# Patient Record
Sex: MALE | Race: ASIAN | Employment: STUDENT | URBAN - METROPOLITAN AREA
[De-identification: names, ages, dates, MRNs, and addresses within clinical notes are randomized per-mention and may not be internally consistent; named-entity substitution may affect disease eponyms.]

---

## 2019-02-19 ENCOUNTER — HOSPITAL ENCOUNTER (EMERGENCY)
Age: 34
Discharge: HOME | End: 2019-02-19
Attending: EMERGENCY MEDICINE
Payer: SELF-PAY

## 2019-02-19 ENCOUNTER — APPOINTMENT (OUTPATIENT)
Dept: CT IMAGING | Age: 34
End: 2019-02-19
Attending: EMERGENCY MEDICINE
Payer: SELF-PAY

## 2019-02-19 ENCOUNTER — APPOINTMENT (OUTPATIENT)
Dept: ULTRASOUND IMAGING | Age: 34
End: 2019-02-19
Attending: EMERGENCY MEDICINE
Payer: SELF-PAY

## 2019-02-19 VITALS
HEIGHT: 67 IN | WEIGHT: 130 LBS | BODY MASS INDEX: 20.4 KG/M2 | HEART RATE: 62 BPM | TEMPERATURE: 97.8 F | DIASTOLIC BLOOD PRESSURE: 93 MMHG | SYSTOLIC BLOOD PRESSURE: 127 MMHG | OXYGEN SATURATION: 100 % | RESPIRATION RATE: 29 BRPM

## 2019-02-19 DIAGNOSIS — N21.1 URETHRAL STONE: Primary | ICD-10-CM

## 2019-02-19 LAB
ALBUMIN SERPL-MCNC: 4 G/DL (ref 3.4–5)
ALBUMIN/GLOB SERPL: 1.3 {RATIO} (ref 0.8–1.7)
ALP SERPL-CCNC: 69 U/L (ref 45–117)
ALT SERPL-CCNC: 27 U/L (ref 16–61)
ANION GAP SERPL CALC-SCNC: 11 MMOL/L (ref 3–18)
APPEARANCE UR: CLEAR
AST SERPL-CCNC: 15 U/L (ref 15–37)
BACTERIA URNS QL MICRO: ABNORMAL /HPF
BASOPHILS # BLD: 0 K/UL (ref 0–0.1)
BASOPHILS NFR BLD: 0 % (ref 0–2)
BILIRUB SERPL-MCNC: 0.5 MG/DL (ref 0.2–1)
BILIRUB UR QL: NEGATIVE
BUN SERPL-MCNC: 13 MG/DL (ref 7–18)
BUN/CREAT SERPL: 14 (ref 12–20)
CALCIUM SERPL-MCNC: 9 MG/DL (ref 8.5–10.1)
CHLORIDE SERPL-SCNC: 103 MMOL/L (ref 100–108)
CO2 SERPL-SCNC: 23 MMOL/L (ref 21–32)
COLOR UR: YELLOW
CREAT SERPL-MCNC: 0.91 MG/DL (ref 0.6–1.3)
DIFFERENTIAL METHOD BLD: ABNORMAL
EOSINOPHIL # BLD: 0.1 K/UL (ref 0–0.4)
EOSINOPHIL NFR BLD: 1 % (ref 0–5)
EPITH CASTS URNS QL MICRO: ABNORMAL /LPF (ref 0–5)
ERYTHROCYTE [DISTWIDTH] IN BLOOD BY AUTOMATED COUNT: 13.6 % (ref 11.6–14.5)
GLOBULIN SER CALC-MCNC: 3.2 G/DL (ref 2–4)
GLUCOSE SERPL-MCNC: 138 MG/DL (ref 74–99)
GLUCOSE UR STRIP.AUTO-MCNC: NEGATIVE MG/DL
HCT VFR BLD AUTO: 38.6 % (ref 36–48)
HGB BLD-MCNC: 12.5 G/DL (ref 13–16)
HGB UR QL STRIP: ABNORMAL
KETONES UR QL STRIP.AUTO: NEGATIVE MG/DL
LEUKOCYTE ESTERASE UR QL STRIP.AUTO: NEGATIVE
LIPASE SERPL-CCNC: 150 U/L (ref 73–393)
LYMPHOCYTES # BLD: 3.7 K/UL (ref 0.9–3.6)
LYMPHOCYTES NFR BLD: 37 % (ref 21–52)
MAGNESIUM SERPL-MCNC: 1.9 MG/DL (ref 1.6–2.6)
MCH RBC QN AUTO: 26.4 PG (ref 24–34)
MCHC RBC AUTO-ENTMCNC: 32.4 G/DL (ref 31–37)
MCV RBC AUTO: 81.4 FL (ref 74–97)
MONOCYTES # BLD: 0.5 K/UL (ref 0.05–1.2)
MONOCYTES NFR BLD: 5 % (ref 3–10)
NEUTS SEG # BLD: 5.6 K/UL (ref 1.8–8)
NEUTS SEG NFR BLD: 57 % (ref 40–73)
NITRITE UR QL STRIP.AUTO: NEGATIVE
PH UR STRIP: 6 [PH] (ref 5–8)
PLATELET # BLD AUTO: 228 K/UL (ref 135–420)
PMV BLD AUTO: 10.4 FL (ref 9.2–11.8)
POTASSIUM SERPL-SCNC: 3.6 MMOL/L (ref 3.5–5.5)
PROT SERPL-MCNC: 7.2 G/DL (ref 6.4–8.2)
PROT UR STRIP-MCNC: NEGATIVE MG/DL
RBC # BLD AUTO: 4.74 M/UL (ref 4.7–5.5)
RBC #/AREA URNS HPF: ABNORMAL /HPF (ref 0–5)
SODIUM SERPL-SCNC: 137 MMOL/L (ref 136–145)
SP GR UR REFRACTOMETRY: 1.02 (ref 1–1.03)
UROBILINOGEN UR QL STRIP.AUTO: 0.2 EU/DL (ref 0.2–1)
WBC # BLD AUTO: 9.9 K/UL (ref 4.6–13.2)
WBC URNS QL MICRO: ABNORMAL /HPF (ref 0–5)

## 2019-02-19 PROCEDURE — 99285 EMERGENCY DEPT VISIT HI MDM: CPT

## 2019-02-19 PROCEDURE — 74011250637 HC RX REV CODE- 250/637: Performed by: EMERGENCY MEDICINE

## 2019-02-19 PROCEDURE — 83735 ASSAY OF MAGNESIUM: CPT

## 2019-02-19 PROCEDURE — 96361 HYDRATE IV INFUSION ADD-ON: CPT

## 2019-02-19 PROCEDURE — 76705 ECHO EXAM OF ABDOMEN: CPT

## 2019-02-19 PROCEDURE — 74177 CT ABD & PELVIS W/CONTRAST: CPT

## 2019-02-19 PROCEDURE — 96374 THER/PROPH/DIAG INJ IV PUSH: CPT

## 2019-02-19 PROCEDURE — 74011250636 HC RX REV CODE- 250/636: Performed by: EMERGENCY MEDICINE

## 2019-02-19 PROCEDURE — 81001 URINALYSIS AUTO W/SCOPE: CPT

## 2019-02-19 PROCEDURE — 80053 COMPREHEN METABOLIC PANEL: CPT

## 2019-02-19 PROCEDURE — 74011636320 HC RX REV CODE- 636/320: Performed by: EMERGENCY MEDICINE

## 2019-02-19 PROCEDURE — 96375 TX/PRO/DX INJ NEW DRUG ADDON: CPT

## 2019-02-19 PROCEDURE — 85025 COMPLETE CBC W/AUTO DIFF WBC: CPT

## 2019-02-19 PROCEDURE — 83690 ASSAY OF LIPASE: CPT

## 2019-02-19 PROCEDURE — 51798 US URINE CAPACITY MEASURE: CPT

## 2019-02-19 RX ORDER — LIDOCAINE 4 G/100G
PATCH TOPICAL
Qty: 1 PACKAGE | Refills: 0 | Status: SHIPPED | OUTPATIENT
Start: 2019-02-19 | End: 2019-02-22

## 2019-02-19 RX ORDER — ACETAMINOPHEN 325 MG/1
650 TABLET ORAL
Qty: 20 TAB | Refills: 0 | Status: SHIPPED | OUTPATIENT
Start: 2019-02-19 | End: 2019-02-22

## 2019-02-19 RX ORDER — IBUPROFEN 600 MG/1
600 TABLET ORAL
Qty: 20 TAB | Refills: 0 | Status: SHIPPED | OUTPATIENT
Start: 2019-02-19 | End: 2019-02-22

## 2019-02-19 RX ORDER — KETOROLAC TROMETHAMINE 15 MG/ML
15 INJECTION, SOLUTION INTRAMUSCULAR; INTRAVENOUS
Status: COMPLETED | OUTPATIENT
Start: 2019-02-19 | End: 2019-02-19

## 2019-02-19 RX ORDER — METOCLOPRAMIDE HYDROCHLORIDE 5 MG/ML
5 INJECTION INTRAMUSCULAR; INTRAVENOUS
Status: COMPLETED | OUTPATIENT
Start: 2019-02-19 | End: 2019-02-19

## 2019-02-19 RX ORDER — ACETAMINOPHEN 325 MG/1
650 TABLET ORAL ONCE
Status: COMPLETED | OUTPATIENT
Start: 2019-02-19 | End: 2019-02-19

## 2019-02-19 RX ORDER — TAMSULOSIN HYDROCHLORIDE 0.4 MG/1
0.4 CAPSULE ORAL DAILY
Qty: 15 CAP | Refills: 0 | Status: SHIPPED | OUTPATIENT
Start: 2019-02-19 | End: 2019-03-06

## 2019-02-19 RX ORDER — ONDANSETRON 4 MG/1
4 TABLET, ORALLY DISINTEGRATING ORAL
Qty: 12 TAB | Refills: 0 | Status: SHIPPED | OUTPATIENT
Start: 2019-02-19

## 2019-02-19 RX ORDER — LIDOCAINE HYDROCHLORIDE 10 MG/ML
30 INJECTION, SOLUTION EPIDURAL; INFILTRATION; INTRACAUDAL; PERINEURAL
Status: COMPLETED | OUTPATIENT
Start: 2019-02-19 | End: 2019-02-19

## 2019-02-19 RX ORDER — LIDOCAINE HYDROCHLORIDE 10 MG/ML
30 INJECTION, SOLUTION EPIDURAL; INFILTRATION; INTRACAUDAL; PERINEURAL
Status: DISCONTINUED | OUTPATIENT
Start: 2019-02-19 | End: 2019-02-19 | Stop reason: CLARIF

## 2019-02-19 RX ADMIN — METOCLOPRAMIDE 5 MG: 5 INJECTION, SOLUTION INTRAMUSCULAR; INTRAVENOUS at 10:54

## 2019-02-19 RX ADMIN — IOPAMIDOL 100 ML: 612 INJECTION, SOLUTION INTRAVENOUS at 11:14

## 2019-02-19 RX ADMIN — SODIUM CHLORIDE 1000 ML: 9 INJECTION, SOLUTION INTRAVENOUS at 10:03

## 2019-02-19 RX ADMIN — ACETAMINOPHEN 650 MG: 325 TABLET ORAL at 12:42

## 2019-02-19 RX ADMIN — LIDOCAINE HYDROCHLORIDE 3 ML: 10 INJECTION, SOLUTION EPIDURAL; INFILTRATION; INTRACAUDAL; PERINEURAL at 10:17

## 2019-02-19 RX ADMIN — SODIUM CHLORIDE 1000 ML: 900 INJECTION, SOLUTION INTRAVENOUS at 10:30

## 2019-02-19 RX ADMIN — KETOROLAC TROMETHAMINE 15 MG: 15 INJECTION, SOLUTION INTRAMUSCULAR; INTRAVENOUS at 10:26

## 2019-02-19 NOTE — ED TRIAGE NOTES
Patient arrived via EMS from [facility] c/o RLQ pain 10/10. Patient appears diaphoretic and in mild distress. VSS and pt has a hx of appendectomy previously. No other abdominal surgeries noted.

## 2019-02-19 NOTE — ED NOTES
Patient returned from CT at this time via stretcher with EDT. Patient is calm and cooperative, Bonny Isdiro, and unable to provide a urine specimen. Patient advised to alert the nurse if he is able to urinate. Pending U/A and CT read. Signed By: Zach Giron February 19, 2019

## 2019-02-19 NOTE — DISCHARGE INSTRUCTIONS
Kidney Stone: Care Instructions  Your Care Instructions    Kidney stones are formed when salts, minerals, and other substances normally found in the urine clump together. They can be as small as grains of sand or, rarely, as large as golf balls. While the stone is traveling through the ureter, which is the tube that carries urine from the kidney to the bladder, you will probably feel pain. The pain may be mild or very severe. You may also have some blood in your urine. As soon as the stone reaches the bladder, any intense pain should go away. If a stone is too large to pass on its own, you may need a medical procedure to help you pass the stone. The doctor has checked you carefully, but problems can develop later. If you notice any problems or new symptoms, get medical treatment right away. Follow-up care is a key part of your treatment and safety. Be sure to make and go to all appointments, and call your doctor if you are having problems. It's also a good idea to know your test results and keep a list of the medicines you take. How can you care for yourself at home? · Drink plenty of fluids, enough so that your urine is light yellow or clear like water. If you have kidney, heart, or liver disease and have to limit fluids, talk with your doctor before you increase the amount of fluids you drink. · Take pain medicines exactly as directed. Call your doctor if you think you are having a problem with your medicine. ? If the doctor gave you a prescription medicine for pain, take it as prescribed. ? If you are not taking a prescription pain medicine, ask your doctor if you can take an over-the-counter medicine. Read and follow all instructions on the label. · Your doctor may ask you to strain your urine so that you can collect your kidney stone when it passes. You can use a kitchen strainer or a tea strainer to catch the stone. Store it in a plastic bag until you see your doctor again.   Preventing future kidney stones  Some changes in your diet may help prevent kidney stones. Depending on the cause of your stones, your doctor may recommend that you:  · Drink plenty of fluids, enough so that your urine is light yellow or clear like water. If you have kidney, heart, or liver disease and have to limit fluids, talk with your doctor before you increase the amount of fluids you drink. · Limit coffee, tea, and alcohol. Also avoid grapefruit juice. · Do not take more than the recommended daily dose of vitamins C and D.  · Avoid antacids such as Gaviscon, Maalox, Mylanta, or Tums. · Limit the amount of salt (sodium) in your diet. · Eat a balanced diet that is not too high in protein. · Limit foods that are high in a substance called oxalate, which can cause kidney stones. These foods include dark green vegetables, rhubarb, chocolate, wheat bran, nuts, cranberries, and beans. When should you call for help? Call your doctor now or seek immediate medical care if:    · You cannot keep down fluids.     · Your pain gets worse.     · You have a fever or chills.     · You have new or worse pain in your back just below your rib cage (the flank area).     · You have new or more blood in your urine.    Watch closely for changes in your health, and be sure to contact your doctor if:    · You do not get better as expected. Where can you learn more? Go to http://slim-michael.info/. Enter E129 in the search box to learn more about \"Kidney Stone: Care Instructions. \"  Current as of: March 14, 2018  Content Version: 11.9  © 2895-3468 [employer]. Care instructions adapted under license by [employer] (which disclaims liability or warranty for this information). If you have questions about a medical condition or this instruction, always ask your healthcare professional. [address] any warranty or liability for your use of this information.

## 2019-02-19 NOTE — ED PROVIDER NOTES
EMERGENCY DEPARTMENT HISTORY AND PHYSICAL EXAM 
 
Date: 2/19/2019 Patient Name: Ramez Crouch History of Presenting Illness Chief Complaint Patient presents with  Abdominal Pain History Provided By: Patient Chief Complaint: RLQ abdominal pain Duration: 1 Days Timing:  Worsening Location: RLQ abdomen Severity: Severe Modifying Factors: 75 mcg of Fentanyl, and 4 mg of Zofran without relief Associated Symptoms: denies any other associated signs or symptoms Additional History (Context):  
9:52 AM 
Ramez Crouch is a 29 y.o. male with PSHx of appendectomy and no other PMHx who presents to the emergency department via EMS for severe, non-radiating RLQ abdominal pain starting yesterday. Reports the pain was tolerable yesterday, but became excruciating and constant this morning. The patient went to [facility], who called EMS. EMS has administered 75 mcg of Fentanyl, and 4 mg of Zofran without relief, and started the patient on fluids. Denies fever, chest pain, vomiting, diarrhea, hematuria, and any other symptoms or complaints. PCP: None Past History Past Medical History: 
History reviewed. No pertinent past medical history. Past Surgical History: 
Past Surgical History:  
Procedure Laterality Date  HX APPENDECTOMY Family History: 
History reviewed. No pertinent family history. Social History: 
Social History Tobacco Use  Smoking status: Never Smoker  Smokeless tobacco: Never Used Substance Use Topics  Alcohol use: Yes  Drug use: No  
 
 
Allergies: 
No Known Allergies Review of Systems Review of Systems Constitutional: Negative for fever. Cardiovascular: Negative for chest pain. Gastrointestinal: Positive for abdominal pain (RLQ). Negative for diarrhea and vomiting. Genitourinary: Negative for hematuria. All other systems reviewed and are negative. Physical Exam  
 
Vitals: 02/19/19 0955 02/19/19 0957 02/19/19 1000 02/19/19 1017 BP: (!) 134/97 (!) 134/97 128/73 (!) 127/93 Pulse: (!) 59 (!) 54 (!) 57 62 Resp:  17 26 29 Temp:  97.8 °F (36.6 °C) SpO2: 99% 100% 100% 100% Weight:  59 kg (130 lb) Height:  5' 7\" (1.702 m) Physical Exam  
Nursing note and vitals reviewed. Constitutional: Pale appearing, young  male, groaning, moaning, anxious, and appears uncomfortable Head: Normocephalic, Atraumatic Eyes: Pupils are equal, round, and reactive to light, EOMI, no scleral icterus, no conjunctival pallor ENT: dry mucous membranes, hearing intact Neck: Supple, non-tender Cardiovascular: Regular rate and rhythm, no murmurs, rubs, or gallops Chest: Normal work of breathing and chest excursion bilaterally Lungs: Clear to ausculation bilaterally, no wheezes, no rhonchi Abdomen: Tenderness to the right upper and lower quadrants. : No testicular swelling, no pain or tenderness Back: No evidence of trauma or deformity Extremities: No evidence of trauma or deformity, no LE edema Skin: Warm and dry, pale appearing, normal cap refill Neuro: Alert and appropriate, CN intact, normal speech, moving all 4 extremities freely and symmetrically Psychiatric: Cooperative, appropriate mood Diagnostic Study Results Labs - Recent Results (from the past 12 hour(s)) CBC WITH AUTOMATED DIFF Collection Time: 02/19/19  9:59 AM  
Result Value Ref Range WBC 9.9 4.6 - 13.2 K/uL  
 RBC 4.74 4.70 - 5.50 M/uL  
 HGB 12.5 (L) 13.0 - 16.0 g/dL HCT 38.6 36.0 - 48.0 % MCV 81.4 74.0 - 97.0 FL  
 MCH 26.4 24.0 - 34.0 PG  
 MCHC 32.4 31.0 - 37.0 g/dL  
 RDW 13.6 11.6 - 14.5 % PLATELET 919 917 - 280 K/uL MPV 10.4 9.2 - 11.8 FL  
 NEUTROPHILS 57 40 - 73 % LYMPHOCYTES 37 21 - 52 % MONOCYTES 5 3 - 10 % EOSINOPHILS 1 0 - 5 % BASOPHILS 0 0 - 2 %  
 ABS. NEUTROPHILS 5.6 1.8 - 8.0 K/UL  
 ABS. LYMPHOCYTES 3.7 (H) 0.9 - 3.6 K/UL ABS. MONOCYTES 0.5 0.05 - 1.2 K/UL  
 ABS. EOSINOPHILS 0.1 0.0 - 0.4 K/UL  
 ABS. BASOPHILS 0.0 0.0 - 0.1 K/UL  
 DF AUTOMATED METABOLIC PANEL, COMPREHENSIVE Collection Time: 02/19/19  9:59 AM  
Result Value Ref Range Sodium 137 136 - 145 mmol/L Potassium 3.6 3.5 - 5.5 mmol/L Chloride 103 100 - 108 mmol/L  
 CO2 23 21 - 32 mmol/L Anion gap 11 3.0 - 18 mmol/L Glucose 138 (H) 74 - 99 mg/dL BUN 13 7.0 - 18 MG/DL Creatinine 0.91 0.6 - 1.3 MG/DL  
 BUN/Creatinine ratio 14 12 - 20 GFR est AA >60 >60 ml/min/1.73m2 GFR est non-AA >60 >60 ml/min/1.73m2 Calcium 9.0 8.5 - 10.1 MG/DL Bilirubin, total 0.5 0.2 - 1.0 MG/DL  
 ALT (SGPT) 27 16 - 61 U/L  
 AST (SGOT) 15 15 - 37 U/L Alk. phosphatase 69 45 - 117 U/L Protein, total 7.2 6.4 - 8.2 g/dL Albumin 4.0 3.4 - 5.0 g/dL Globulin 3.2 2.0 - 4.0 g/dL A-G Ratio 1.3 0.8 - 1.7 LIPASE Collection Time: 02/19/19  9:59 AM  
Result Value Ref Range Lipase 150 73 - 393 U/L MAGNESIUM Collection Time: 02/19/19  9:59 AM  
Result Value Ref Range Magnesium 1.9 1.6 - 2.6 mg/dL URINALYSIS W/ RFLX MICROSCOPIC Collection Time: 02/19/19 11:45 AM  
Result Value Ref Range Color YELLOW Appearance CLEAR Specific gravity 1.025 1.005 - 1.030    
 pH (UA) 6.0 5.0 - 8.0 Protein NEGATIVE  NEG mg/dL Glucose NEGATIVE  NEG mg/dL Ketone NEGATIVE  NEG mg/dL Bilirubin NEGATIVE  NEG Blood MODERATE (A) NEG Urobilinogen 0.2 0.2 - 1.0 EU/dL Nitrites NEGATIVE  NEG Leukocyte Esterase NEGATIVE  NEG    
URINE MICROSCOPIC ONLY Collection Time: 02/19/19 11:45 AM  
Result Value Ref Range WBC 1 to 3 0 - 5 /hpf  
 RBC 8 to 12 0 - 5 /hpf Epithelial cells 1+ 0 - 5 /lpf Bacteria FEW (A) NEG /hpf Radiologic Studies -  
CT ABD PELV W CONT Final Result IMPRESSION:  
  
1. Mild to moderate right-sided hydroureteronephrosis related to a roughly 2 mm stone present at the right-sided ureterovesicular junction. 2. Mild periportal edema and gallbladder wall thickening likely secondary to  
changes of hydration. Notably no evidence of gallbladder wall thickening noted  
on the preceding ultrasound exam obtained approximately 1 hour previously. As read by the radiologist.   
Rolfe Fleischer Final Result IMPRESSION:  
  
  
1. No evidence of gallstones or findings to suggest acute cholecystitis. 2. Moderate right-sided hydronephrosis, of unclear etiology on the provided  
imaging. Correlation for history of nephrolithiasis and hematuria may be  
helpful. As read by the radiologist.   
 
CT Results  (Last 48 hours) 02/19/19 1136  CT ABD PELV W CONT Final result Impression:  IMPRESSION:  
   
1. Mild to moderate right-sided hydroureteronephrosis related to a roughly 2 mm  
stone present at the right-sided ureterovesicular junction. 2. Mild periportal edema and gallbladder wall thickening likely secondary to  
changes of hydration. Notably no evidence of gallbladder wall thickening noted  
on the preceding ultrasound exam obtained approximately 1 hour previously. Narrative:  EXAM: CT of the abdomen and pelvis INDICATION: Right-sided abdominal pain COMPARISON: Same-day ultrasound TECHNIQUE: Axial CT imaging of the abdomen and pelvis was performed without oral  
and with intravenous contrast. Multiplanar reformats were generated. One or more dose reduction techniques were used on this CT: automated exposure  
control, adjustment of the mAs and/or kVp according to patient size, and  
iterative reconstruction techniques. The specific techniques used on this CT  
exam have been documented in the patient's electronic medical record.  Digital  
Imaging and Communications in Medicine (DICOM) format image data are available  
to nonaffiliated external healthcare facilities or entities on a secure, media  
free, reciprocally searchable basis with patient authorization for at least a  
12-month period after this study. _______________ FINDINGS:  
   
LOWER CHEST: Lung bases are clear. Normal cardiac size. No pericardial effusion. LIVER, BILIARY: Hepatic parenchymal enhancement is uniform. Mild periportal  
edema noted. Intrahepatic or extrahepatic biliary ductal dilatation. There is  
mild wall thickening noted which is in the interval from same day right upper  
quadrant ultrasound. PANCREAS: Normal.  
   
SPLEEN: Normal.  
   
ADRENALS: Normal.  
   
KIDNEYS/URETERS/BLADDER: There is asymmetric hypoenhancement of the right kidney  
noted with mild to moderate right-sided hydroureteronephrosis present. There is  
a roughly 2 mm stone present at the right ureterovesicular junction. PELVIC ORGANS: Unremarkable. VASCULATURE: Unremarkable LYMPH NODES: There are scattered subcentimeter mesenteric and retroperitoneal  
lymph nodes present without evidence of abdominal or pelvic adenopathy. GASTROINTESTINAL TRACT: Stomach, small intestine, and large intestine are normal  
in course and caliber. No bowel obstruction. No free intraperitoneal gas. Surgical clips at the cecal base are noted in keeping with prior appendectomy. BONES: No acute or aggressive osseous abnormalities identified. OTHER: Small fat-containing umbilical hernia.  
   
_______________ CXR Results  (Last 48 hours) None Medications given in the ED- Medications  
sodium chloride 0.9 % bolus infusion 1,000 mL (not administered)  
sodium chloride 0.9 % bolus infusion 1,000 mL (0 mL IntraVENous IV Completed 2/19/19 1117) lidocaine (PF) (XYLOCAINE) 10 mg/mL (1 %) injection 3 mL (3 mL IntraVENous Given 2/19/19 1017)  
ketorolac (TORADOL) injection 15 mg (15 mg IntraVENous Given 2/19/19 1026)  
metoclopramide HCl (REGLAN) injection 5 mg (5 mg IntraVENous Given 2/19/19 1054) iopamidol (ISOVUE 300) 61 % contrast injection 100 mL (100 mL IntraVENous Given 2/19/19 1114) Medical Decision Making I am the first provider for this patient. I reviewed the vital signs, available nursing notes, past medical history, past surgical history, family history and social history. Vital Signs-Reviewed the patient's vital signs. Pulse Oximetry Analysis - 100% on room air Records Reviewed: Nursing Notes Provider Notes (Medical Decision Making): 29year old male with hx of appendectomy and no other PMHX presenting for RLQ pain. On exam, he is uncomfortable and pale appearing, but afebrile with appropriate vital signs. Will obtain lab work, CT to evaluate stone, a RUQ US to evaluate gallbladder pathology, and provide symptom control. Procedures: 
Procedures ED Course:  
9:52 AM Initial assessment performed. The patients presenting problems have been discussed, and they are in agreement with the care plan formulated and outlined with them. I have encouraged them to ask questions as they arise throughout their visit. 12:25 PM CT showing 2 mm at the UVJ junction. UA reassuring for UTI. Cr WNL. Will DC with sx control and urology FU. Diagnosis and Disposition DISCHARGE NOTE: 
12:27 PM  
Tres Toure's  results have been reviewed with him. He has been counseled regarding his diagnosis, treatment, and plan. He verbally conveys understanding and agreement of the signs, symptoms, diagnosis, treatment and prognosis and additionally agrees to follow up as discussed. He also agrees with the care-plan and conveys that all of his questions have been answered. I have also provided discharge instructions for him that include: educational information regarding their diagnosis and treatment, and list of reasons why they would want to return to the ED prior to their follow-up appointment, should his condition change.  He has been provided with education for proper emergency department utilization. CLINICAL IMPRESSION: 
 
1. Urethral stone PLAN: 
1. D/C Home 2. Current Discharge Medication List  
  
START taking these medications Details  
tamsulosin (FLOMAX) 0.4 mg capsule Take 1 Cap by mouth daily for 15 days. Qty: 15 Cap, Refills: 0  
  
ibuprofen (MOTRIN) 600 mg tablet Take 1 Tab by mouth every six (6) hours as needed for Pain. Qty: 20 Tab, Refills: 0  
  
acetaminophen (TYLENOL) 325 mg tablet Take 2 Tabs by mouth every four (4) hours as needed for Pain. Qty: 20 Tab, Refills: 0  
  
ondansetron (ZOFRAN ODT) 4 mg disintegrating tablet Take 1 Tab by mouth every eight (8) hours as needed for Nausea. Qty: 12 Tab, Refills: 0  
  
lidocaine 4 % patch Apply to R flank daily 
Qty: 1 Package, Refills: 0  
  
  
 
3. Follow-up Information Follow up With Specialties Details Why Contact Info Kelsey Khanna MD Urology Schedule an appointment as soon as possible for a visit in 2 days For urology follow up 1 \[facility]\"" [address] [place] [id number] 
[phone] [facility] EMERGENCY DEPT Emergency Medicine  As needed, If symptoms worsen [address] [place] [id number] 
[phone]  
  
 
_______________________________ Attestations: This note is prepared by Sheldon Odom, acting as Scribe for General Harlan DO. General Harlan DO:  The scribe's documentation has been prepared under my direction and personally reviewed by me in its entirety. I confirm that the note above accurately reflects all work, treatment, procedures, and medical decision making performed by me. 
_______________________________

## 2019-02-22 ENCOUNTER — APPOINTMENT (OUTPATIENT)
Dept: CT IMAGING | Age: 34
End: 2019-02-22
Attending: EMERGENCY MEDICINE
Payer: SELF-PAY

## 2019-02-22 ENCOUNTER — HOSPITAL ENCOUNTER (EMERGENCY)
Age: 34
Discharge: HOME | End: 2019-02-22
Attending: EMERGENCY MEDICINE
Payer: SELF-PAY

## 2019-02-22 VITALS
SYSTOLIC BLOOD PRESSURE: 102 MMHG | HEIGHT: 67 IN | WEIGHT: 130 LBS | RESPIRATION RATE: 16 BRPM | BODY MASS INDEX: 20.4 KG/M2 | DIASTOLIC BLOOD PRESSURE: 57 MMHG | HEART RATE: 61 BPM | OXYGEN SATURATION: 99 % | TEMPERATURE: 98.8 F

## 2019-02-22 DIAGNOSIS — N20.1 URETEROLITHIASIS: Primary | ICD-10-CM

## 2019-02-22 DIAGNOSIS — R10.9 ACUTE RIGHT FLANK PAIN: ICD-10-CM

## 2019-02-22 LAB
ALBUMIN SERPL-MCNC: 4.2 G/DL (ref 3.4–5)
ALBUMIN/GLOB SERPL: 1.2 {RATIO} (ref 0.8–1.7)
ALP SERPL-CCNC: 71 U/L (ref 45–117)
ALT SERPL-CCNC: 41 U/L (ref 16–61)
ANION GAP SERPL CALC-SCNC: 9 MMOL/L (ref 3–18)
APPEARANCE UR: CLEAR
AST SERPL-CCNC: 19 U/L (ref 15–37)
BACTERIA URNS QL MICRO: ABNORMAL /HPF
BASOPHILS # BLD: 0 K/UL (ref 0–0.1)
BASOPHILS NFR BLD: 0 % (ref 0–2)
BILIRUB SERPL-MCNC: 0.5 MG/DL (ref 0.2–1)
BILIRUB UR QL: NEGATIVE
BUN SERPL-MCNC: 7 MG/DL (ref 7–18)
BUN/CREAT SERPL: 7 (ref 12–20)
CALCIUM SERPL-MCNC: 8.8 MG/DL (ref 8.5–10.1)
CHLORIDE SERPL-SCNC: 103 MMOL/L (ref 100–108)
CO2 SERPL-SCNC: 28 MMOL/L (ref 21–32)
COLOR UR: YELLOW
CREAT SERPL-MCNC: 0.94 MG/DL (ref 0.6–1.3)
DIFFERENTIAL METHOD BLD: ABNORMAL
EOSINOPHIL # BLD: 0 K/UL (ref 0–0.4)
EOSINOPHIL NFR BLD: 0 % (ref 0–5)
EPITH CASTS URNS QL MICRO: ABNORMAL /LPF (ref 0–5)
ERYTHROCYTE [DISTWIDTH] IN BLOOD BY AUTOMATED COUNT: 13.7 % (ref 11.6–14.5)
GLOBULIN SER CALC-MCNC: 3.5 G/DL (ref 2–4)
GLUCOSE SERPL-MCNC: 129 MG/DL (ref 74–99)
GLUCOSE UR STRIP.AUTO-MCNC: NEGATIVE MG/DL
HCT VFR BLD AUTO: 38.7 % (ref 36–48)
HGB BLD-MCNC: 12.3 G/DL (ref 13–16)
HGB UR QL STRIP: ABNORMAL
KETONES UR QL STRIP.AUTO: NEGATIVE MG/DL
LEUKOCYTE ESTERASE UR QL STRIP.AUTO: NEGATIVE
LIPASE SERPL-CCNC: 151 U/L (ref 73–393)
LYMPHOCYTES # BLD: 1.5 K/UL (ref 0.9–3.6)
LYMPHOCYTES NFR BLD: 9 % (ref 21–52)
MCH RBC QN AUTO: 26.3 PG (ref 24–34)
MCHC RBC AUTO-ENTMCNC: 31.8 G/DL (ref 31–37)
MCV RBC AUTO: 82.7 FL (ref 74–97)
MONOCYTES # BLD: 0.7 K/UL (ref 0.05–1.2)
MONOCYTES NFR BLD: 4 % (ref 3–10)
NEUTS SEG # BLD: 14.2 K/UL (ref 1.8–8)
NEUTS SEG NFR BLD: 87 % (ref 40–73)
NITRITE UR QL STRIP.AUTO: NEGATIVE
PH UR STRIP: 5.5 [PH] (ref 5–8)
PLATELET # BLD AUTO: 224 K/UL (ref 135–420)
PMV BLD AUTO: 10.5 FL (ref 9.2–11.8)
POTASSIUM SERPL-SCNC: 3.6 MMOL/L (ref 3.5–5.5)
PROT SERPL-MCNC: 7.7 G/DL (ref 6.4–8.2)
PROT UR STRIP-MCNC: NEGATIVE MG/DL
RBC # BLD AUTO: 4.68 M/UL (ref 4.7–5.5)
RBC #/AREA URNS HPF: ABNORMAL /HPF (ref 0–5)
SODIUM SERPL-SCNC: 140 MMOL/L (ref 136–145)
SP GR UR REFRACTOMETRY: 1.02 (ref 1–1.03)
UROBILINOGEN UR QL STRIP.AUTO: 0.2 EU/DL (ref 0.2–1)
WBC # BLD AUTO: 16.4 K/UL (ref 4.6–13.2)
WBC URNS QL MICRO: ABNORMAL /HPF (ref 0–5)

## 2019-02-22 PROCEDURE — 81001 URINALYSIS AUTO W/SCOPE: CPT

## 2019-02-22 PROCEDURE — 96375 TX/PRO/DX INJ NEW DRUG ADDON: CPT

## 2019-02-22 PROCEDURE — 74011250637 HC RX REV CODE- 250/637: Performed by: EMERGENCY MEDICINE

## 2019-02-22 PROCEDURE — 83690 ASSAY OF LIPASE: CPT

## 2019-02-22 PROCEDURE — 74011250636 HC RX REV CODE- 250/636: Performed by: EMERGENCY MEDICINE

## 2019-02-22 PROCEDURE — 96374 THER/PROPH/DIAG INJ IV PUSH: CPT

## 2019-02-22 PROCEDURE — 96361 HYDRATE IV INFUSION ADD-ON: CPT

## 2019-02-22 PROCEDURE — 99284 EMERGENCY DEPT VISIT MOD MDM: CPT

## 2019-02-22 PROCEDURE — 80053 COMPREHEN METABOLIC PANEL: CPT

## 2019-02-22 PROCEDURE — 74176 CT ABD & PELVIS W/O CONTRAST: CPT

## 2019-02-22 PROCEDURE — 85025 COMPLETE CBC W/AUTO DIFF WBC: CPT

## 2019-02-22 RX ORDER — IBUPROFEN 800 MG/1
800 TABLET ORAL
Qty: 30 TAB | Refills: 0 | Status: SHIPPED | OUTPATIENT
Start: 2019-02-22

## 2019-02-22 RX ORDER — OXYCODONE AND ACETAMINOPHEN 5; 325 MG/1; MG/1
TABLET ORAL
Qty: 20 TAB | Refills: 0 | Status: SHIPPED | OUTPATIENT
Start: 2019-02-22

## 2019-02-22 RX ORDER — MORPHINE SULFATE 10 MG/ML
6 INJECTION, SOLUTION INTRAMUSCULAR; INTRAVENOUS
Status: DISCONTINUED | OUTPATIENT
Start: 2019-02-22 | End: 2019-02-22 | Stop reason: HOSPADM

## 2019-02-22 RX ORDER — KETOROLAC TROMETHAMINE 30 MG/ML
30 INJECTION, SOLUTION INTRAMUSCULAR; INTRAVENOUS
Status: COMPLETED | OUTPATIENT
Start: 2019-02-22 | End: 2019-02-22

## 2019-02-22 RX ORDER — DICYCLOMINE HYDROCHLORIDE 10 MG/1
20 CAPSULE ORAL
Status: COMPLETED | OUTPATIENT
Start: 2019-02-22 | End: 2019-02-22

## 2019-02-22 RX ORDER — ONDANSETRON 2 MG/ML
4 INJECTION INTRAMUSCULAR; INTRAVENOUS
Status: COMPLETED | OUTPATIENT
Start: 2019-02-22 | End: 2019-02-22

## 2019-02-22 RX ORDER — SODIUM CHLORIDE 0.9 % (FLUSH) 0.9 %
5-40 SYRINGE (ML) INJECTION EVERY 8 HOURS
Status: DISCONTINUED | OUTPATIENT
Start: 2019-02-22 | End: 2019-02-22 | Stop reason: HOSPADM

## 2019-02-22 RX ORDER — SODIUM CHLORIDE 0.9 % (FLUSH) 0.9 %
5-40 SYRINGE (ML) INJECTION AS NEEDED
Status: DISCONTINUED | OUTPATIENT
Start: 2019-02-22 | End: 2019-02-22 | Stop reason: HOSPADM

## 2019-02-22 RX ADMIN — ONDANSETRON 4 MG: 2 INJECTION INTRAMUSCULAR; INTRAVENOUS at 04:42

## 2019-02-22 RX ADMIN — DICYCLOMINE HYDROCHLORIDE 20 MG: 10 CAPSULE ORAL at 04:44

## 2019-02-22 RX ADMIN — SODIUM CHLORIDE 1000 ML: 900 INJECTION, SOLUTION INTRAVENOUS at 04:41

## 2019-02-22 RX ADMIN — KETOROLAC TROMETHAMINE 30 MG: 30 INJECTION, SOLUTION INTRAMUSCULAR at 04:42

## 2019-02-22 NOTE — ED TRIAGE NOTES
Pt to ED via personal vehicle for right flank pain. Pt was seen here 3 days ago for a kidney stone, returning with 10/10 pain. Pt in obvious distress, unable to answer questions, writhing in pain.

## 2019-02-22 NOTE — ED PROVIDER NOTES
EMERGENCY DEPARTMENT HISTORY AND PHYSICAL EXAM 
 
Date: 2/22/2019 Patient Name: Monse Genao History of Presenting Illness Chief Complaint Patient presents with  Flank Pain History Provided By: Patient and Patient's Wife Chief Complaint: Flank pain Duration: Just PTA Timing:  Constant Location: Right flank Quality: Pressure Severity: 10 out of 10 Modifying Factors: Tylenol & Ibuprofen taken to no relief Associated Symptoms: diaphoresis Additional History (Context):  
4:57 AM 
Monse Genao is a 29 y.o. male with PMHX of kidney stones who presents to the emergency department C/O 10/10 pressure-like right flank pain onset just PTA. Tylenol and Ibuprofen taken to no relief. Associated sxs include diaphoresis. Patient reports that his pain steadily arose. States that he was seen in this ED 3 days ago, was diagnosed with kidney stones, and was given instructions to follow up with Penny Ly MD (urologist), who has been unable to see the patient. Notes PSHx of appendectomy in 2017. Pt denies any other sxs or complaints. PCP: None Current Facility-Administered Medications Medication Dose Route Frequency Provider Last Rate Last Dose  sodium chloride (NS) flush 5-40 mL  5-40 mL IntraVENous Q8H Caterina Mclaughlin MD      
 sodium chloride (NS) flush 5-40 mL  5-40 mL IntraVENous PRN Caterina Mclaughlin MD      
 morphine 10 mg/ml injection 6 mg  6 mg IntraVENous NOW Caterina Mclaughlin MD      
 
Current Outpatient Medications Medication Sig Dispense Refill  ibuprofen (MOTRIN) 800 mg tablet Take 1 Tab by mouth every eight (8) hours as needed for Pain. 30 Tab 0  
 oxyCODONE-acetaminophen (PERCOCET) 5-325 mg per tablet Take 1 tablet every 4-6 hours as needed for pain control. If you were instructed to try over the counter ibuprofen or tylenol, only take the percocet for pain not controlled with the over the counter medication.  20 Tab 0  
  tamsulosin (FLOMAX) 0.4 mg capsule Take 1 Cap by mouth daily for 15 days. 15 Cap 0  
 ondansetron (ZOFRAN ODT) 4 mg disintegrating tablet Take 1 Tab by mouth every eight (8) hours as needed for Nausea. 12 Tab 0 Past History Past Medical History: 
History reviewed. No pertinent past medical history. Past Surgical History: 
Past Surgical History:  
Procedure Laterality Date  HX APPENDECTOMY Family History: 
History reviewed. No pertinent family history. Social History: 
Social History Tobacco Use  Smoking status: Never Smoker  Smokeless tobacco: Never Used Substance Use Topics  Alcohol use: Yes  Drug use: No  
 
 
Allergies: 
No Known Allergies Review of Systems Review of Systems Constitutional: Positive for diaphoresis. Negative for chills and fever. HENT: Negative for congestion and sore throat. Respiratory: Negative for cough and shortness of breath. Cardiovascular: Negative for chest pain and palpitations. Gastrointestinal: Negative for abdominal pain, nausea and vomiting. Genitourinary: Positive for flank pain (Right flank). Negative for dysuria and frequency. Musculoskeletal: Negative for arthralgias, joint swelling and myalgias. Skin: Negative for color change and wound. Neurological: Negative for dizziness, weakness, light-headedness and headaches. Hematological: Negative for adenopathy. Physical Exam  
 
Vitals:  
 02/22/19 0500 02/22/19 0530 02/22/19 0600 02/22/19 5550 BP: 139/79 116/63 106/63 Pulse:      
Resp:      
Temp:      
SpO2:  100%  99% Weight:      
Height:      
 
Physical Exam  
Constitutional: He is oriented to person, place, and time. He appears well-developed and well-nourished. Non-toxic appearance. Uncomfortable-appearing HENT:  
Head: Normocephalic and atraumatic. Eyes: EOM are normal. Pupils are equal, round, and reactive to light. No scleral icterus. No pallor Neck: Normal range of motion. Neck supple. Cardiovascular: Normal rate, normal heart sounds and intact distal pulses. Pulmonary/Chest: Effort normal and breath sounds normal. No respiratory distress. Abdominal: Soft. Bowel sounds are normal. He exhibits no distension. There is tenderness. There is guarding and CVA tenderness. Right-sided CVA tenderness with voluntary guarding Musculoskeletal: Normal range of motion. He exhibits no edema. Neurological: He is alert and oriented to person, place, and time. Skin: Skin is warm and dry. No rash noted. Psychiatric: He has a normal mood and affect. His behavior is normal.  
Nursing note and vitals reviewed. Diagnostic Study Results Labs - Recent Results (from the past 12 hour(s)) CBC WITH AUTOMATED DIFF Collection Time: 02/22/19  4:30 AM  
Result Value Ref Range WBC 16.4 (H) 4.6 - 13.2 K/uL  
 RBC 4.68 (L) 4.70 - 5.50 M/uL  
 HGB 12.3 (L) 13.0 - 16.0 g/dL HCT 38.7 36.0 - 48.0 % MCV 82.7 74.0 - 97.0 FL  
 MCH 26.3 24.0 - 34.0 PG  
 MCHC 31.8 31.0 - 37.0 g/dL  
 RDW 13.7 11.6 - 14.5 % PLATELET 570 596 - 417 K/uL MPV 10.5 9.2 - 11.8 FL  
 NEUTROPHILS 87 (H) 40 - 73 % LYMPHOCYTES 9 (L) 21 - 52 % MONOCYTES 4 3 - 10 % EOSINOPHILS 0 0 - 5 % BASOPHILS 0 0 - 2 %  
 ABS. NEUTROPHILS 14.2 (H) 1.8 - 8.0 K/UL  
 ABS. LYMPHOCYTES 1.5 0.9 - 3.6 K/UL  
 ABS. MONOCYTES 0.7 0.05 - 1.2 K/UL  
 ABS. EOSINOPHILS 0.0 0.0 - 0.4 K/UL  
 ABS. BASOPHILS 0.0 0.0 - 0.1 K/UL  
 DF AUTOMATED METABOLIC PANEL, COMPREHENSIVE Collection Time: 02/22/19  4:30 AM  
Result Value Ref Range Sodium 140 136 - 145 mmol/L Potassium 3.6 3.5 - 5.5 mmol/L Chloride 103 100 - 108 mmol/L  
 CO2 28 21 - 32 mmol/L Anion gap 9 3.0 - 18 mmol/L Glucose 129 (H) 74 - 99 mg/dL BUN 7 7.0 - 18 MG/DL Creatinine 0.94 0.6 - 1.3 MG/DL  
 BUN/Creatinine ratio 7 (L) 12 - 20 GFR est AA >60 >60 ml/min/1.73m2 GFR est non-AA >60 >60 ml/min/1.73m2 Calcium 8.8 8.5 - 10.1 MG/DL Bilirubin, total 0.5 0.2 - 1.0 MG/DL  
 ALT (SGPT) 41 16 - 61 U/L  
 AST (SGOT) 19 15 - 37 U/L Alk. phosphatase 71 45 - 117 U/L Protein, total 7.7 6.4 - 8.2 g/dL Albumin 4.2 3.4 - 5.0 g/dL Globulin 3.5 2.0 - 4.0 g/dL A-G Ratio 1.2 0.8 - 1.7 LIPASE Collection Time: 02/22/19  4:30 AM  
Result Value Ref Range Lipase 151 73 - 393 U/L  
URINALYSIS W/ RFLX MICROSCOPIC Collection Time: 02/22/19  5:30 AM  
Result Value Ref Range Color YELLOW Appearance CLEAR Specific gravity 1.018 1.005 - 1.030    
 pH (UA) 5.5 5.0 - 8.0 Protein NEGATIVE  NEG mg/dL Glucose NEGATIVE  NEG mg/dL Ketone NEGATIVE  NEG mg/dL Bilirubin NEGATIVE  NEG Blood MODERATE (A) NEG Urobilinogen 0.2 0.2 - 1.0 EU/dL Nitrites NEGATIVE  NEG Leukocyte Esterase NEGATIVE  NEG    
URINE MICROSCOPIC ONLY Collection Time: 02/22/19  5:30 AM  
Result Value Ref Range WBC 1 to 3 0 - 5 /hpf  
 RBC 1 to 3 0 - 5 /hpf Epithelial cells FEW 0 - 5 /lpf Bacteria FEW (A) NEG /hpf Radiologic Studies -  
 
CT Results  (Last 48 hours) 02/22/19 0635  CT ABD PELV WO CONT Final result Impression:  Impression: A mildly obstructing 3 mm right UVJ calculus is again evident Narrative:  Examination:   CT Abdomen and Pelvis Indication:  Right flank pain Comparison:  02/19/19 Technique: Volumetric scanning of the abdomen and pelvis without oral or  
intravenous contrast per the ureteral calculus protocol. Coronal and sagittal  
reconstructions were performed. One or more dose reduction techniques were used  
on this CT: automated exposure control, adjustment of the mAs and/or kVp  
according to patient size, and iterative reconstruction techniques. The  
specific techniques used on this CT exam have been documented in the patient's  
electronic medical record. Digital Imaging and Communications in Medicine (DICOM) format image data are available to nonaffiliated external healthcare  
facilities or entities on a secure, media free, reciprocally searchable basis  
with patient authorization for at least a 12-month period after this study. Findings:   
   
ABDOMEN:  
   
Lung bases:  Bilateral dependent atelectasis Kidneys: The left kidney is unremarkable. The right kidney demonstrates  
obstructive changes with mild hydronephrosis. Ureters:  Right-sided hydroureter is evident. The 3 mm mildly obstructing right UVJ calculus is again present. The adjacent bladder wall shows mild thickening  
likely related to edema Solid viscera:  No suspicious masses Gallbladder and bile ducts: The gallbladder is normal. The bile ducts are of  
normal caliber Retroperitoneum:  No enlarged nodes PELVIS:    
   
Ureters:  As above Bladder:  No suspicious bladder masses Bowel and mesentery:  No obstructive changes Lymph nodes:  No enlarged nodes Osseous structures:  Intact Other:  None CXR Results  (Last 48 hours) None Medications given in the ED- Medications  
sodium chloride (NS) flush 5-40 mL (not administered)  
sodium chloride (NS) flush 5-40 mL (not administered) morphine 10 mg/ml injection 6 mg (6 mg IntraVENous Refused 2/22/19 0518)  
sodium chloride 0.9 % bolus infusion 1,000 mL (0 mL IntraVENous IV Completed 2/22/19 0541)  
ketorolac (TORADOL) injection 30 mg (30 mg IntraVENous Given 2/22/19 0442) dicyclomine (BENTYL) capsule 20 mg (20 mg Oral Given 2/22/19 0444) ondansetron (ZOFRAN) injection 4 mg (4 mg IntraVENous Given 2/22/19 0442) Medical Decision Making I am the first provider for this patient. I reviewed the vital signs, available nursing notes, past medical history, past surgical history, family history and social history. Vital Signs-Reviewed the patient's vital signs. Pulse Oximetry Analysis - 100% on RA Records Reviewed: Nursing Notes and Old Medical Records Provider Notes (Medical Decision Making): Kidney stone or infection, obstructive uropathy. Need scan to assess changes in renal function, white count, and discussion with urology to treat symptoms. Procedures: 
Procedures ED Course:  
4:57 AM Initial assessment performed. The patients presenting problems have been discussed, and they are in agreement with the care plan formulated and outlined with them. I have encouraged them to ask questions as they arise throughout their visit. 8:07 AM Discussed patient's history, exam, and available diagnostics results with Dr. Haylie Jules MD (Urology), who agrees with seeing pt in f/u and will utilize  to see to financial arrangements. 8:38 AM  states they will assist with making some financial arrangements for the pt. Pt will have to partially make some payments. Diagnosis and Disposition DISCHARGE NOTE: 
8:36 AM 
Tres Toure's  results have been reviewed with him. He has been counseled regarding his diagnosis, treatment, and plan. He verbally conveys understanding and agreement of the signs, symptoms, diagnosis, treatment and prognosis and additionally agrees to follow up as discussed. He also agrees with the care-plan and conveys that all of his questions have been answered. I have also provided discharge instructions for him that include: educational information regarding their diagnosis and treatment, and list of reasons why they would want to return to the ED prior to their follow-up appointment, should his condition change. He has been provided with education for proper emergency department utilization. CLINICAL IMPRESSION: 
 
1. Ureterolithiasis 2. Acute right flank pain PLAN: 
1. D/C Home 2. Current Discharge Medication List  
  
START taking these medications Details oxyCODONE-acetaminophen (PERCOCET) 5-325 mg per tablet Take 1 tablet every 4-6 hours as needed for pain control. If you were instructed to try over the counter ibuprofen or tylenol, only take the percocet for pain not controlled with the over the counter medication. Qty: 20 Tab, Refills: 0 Associated Diagnoses: Ureterolithiasis; Acute right flank pain CONTINUE these medications which have CHANGED Details  
ibuprofen (MOTRIN) 800 mg tablet Take 1 Tab by mouth every eight (8) hours as needed for Pain. Qty: 30 Tab, Refills: 0 CONTINUE these medications which have NOT CHANGED Details  
tamsulosin (FLOMAX) 0.4 mg capsule Take 1 Cap by mouth daily for 15 days. Qty: 15 Cap, Refills: 0  
  
ondansetron (ZOFRAN ODT) 4 mg disintegrating tablet Take 1 Tab by mouth every eight (8) hours as needed for Nausea. Qty: 12 Tab, Refills: 0 STOP taking these medications  
  
 acetaminophen (TYLENOL) 325 mg tablet Comments:  
Reason for Stopping:   
   
 lidocaine 4 % patch Comments:  
Reason for Stopping: 3.  
Follow-up Information Follow up With Specialties Details Why Contact Info Maria Esther Cardenas MD Urology Schedule an appointment as soon as possible for a visit For Urology Follow Up [address] [place] [address] 
[phone] [facility] EMERGENCY DEPT Emergency Medicine Go to If symptoms worsen, As needed [address] [place] [address] 
[phone]  
  
 
_______________________________ Attestations: This note is prepared by Joan Mondragon and Aviva Hope, acting as Scribe for Obinna Cottrell. Brenda Albert MD. Obinna Cottrell. Brenda Albert MD:  The scribe's documentation has been prepared under my direction and personally reviewed by me in its entirety. I confirm that the note above accurately reflects all work, treatment, procedures, and medical decision making performed by me. 
_______________________________

## 2019-02-22 NOTE — ED NOTES
Discharge instructions reviewed with the patient with opportunity for questions given with Fabiola Vicente MD at bedside. The patient verbalized understanding. Patient armband removed and shredded. Patient in stable condition at time of discharge.

## 2019-02-22 NOTE — ED NOTES
Pt hourly rounding competed. Safety Pt (x) resting on stretcher with side rails up and call bell in reach. () in chair 
  () in parents arms. Toileting Pt offered ()Bedpan 
   ()Assistance to Restroom (x)Urinal 
Ongoing UpdatesUpdated on plan of care and status of test results. Pain Management Inquired as to comfort and offered comfort measures: 
  (x) warm blankets (x) dimmed lights Pt resting in stretcher with marked improvement in pain, currently declining morphine at this time, states the Toradol helped. Collected urine specimen. Will continue to monitor. Call bell within reach, side rails x 1, stretcher in lowest position.

## 2019-02-22 NOTE — DISCHARGE INSTRUCTIONS
Kidney Stone: Care Instructions  Your Care Instructions    Kidney stones are formed when salts, minerals, and other substances normally found in the urine clump together. They can be as small as grains of sand or, rarely, as large as golf balls. While the stone is traveling through the ureter, which is the tube that carries urine from the kidney to the bladder, you will probably feel pain. The pain may be mild or very severe. You may also have some blood in your urine. As soon as the stone reaches the bladder, any intense pain should go away. If a stone is too large to pass on its own, you may need a medical procedure to help you pass the stone. The doctor has checked you carefully, but problems can develop later. If you notice any problems or new symptoms, get medical treatment right away. Follow-up care is a key part of your treatment and safety. Be sure to make and go to all appointments, and call your doctor if you are having problems. It's also a good idea to know your test results and keep a list of the medicines you take. How can you care for yourself at home? · Drink plenty of fluids, enough so that your urine is light yellow or clear like water. If you have kidney, heart, or liver disease and have to limit fluids, talk with your doctor before you increase the amount of fluids you drink. · Take pain medicines exactly as directed. Call your doctor if you think you are having a problem with your medicine. ? If the doctor gave you a prescription medicine for pain, take it as prescribed. ? If you are not taking a prescription pain medicine, ask your doctor if you can take an over-the-counter medicine. Read and follow all instructions on the label. · Your doctor may ask you to strain your urine so that you can collect your kidney stone when it passes. You can use a kitchen strainer or a tea strainer to catch the stone. Store it in a plastic bag until you see your doctor again.   Preventing future kidney stones  Some changes in your diet may help prevent kidney stones. Depending on the cause of your stones, your doctor may recommend that you:  · Drink plenty of fluids, enough so that your urine is light yellow or clear like water. If you have kidney, heart, or liver disease and have to limit fluids, talk with your doctor before you increase the amount of fluids you drink. · Limit coffee, tea, and alcohol. Also avoid grapefruit juice. · Do not take more than the recommended daily dose of vitamins C and D.  · Avoid antacids such as Gaviscon, Maalox, Mylanta, or Tums. · Limit the amount of salt (sodium) in your diet. · Eat a balanced diet that is not too high in protein. · Limit foods that are high in a substance called oxalate, which can cause kidney stones. These foods include dark green vegetables, rhubarb, chocolate, wheat bran, nuts, cranberries, and beans. When should you call for help? Call your doctor now or seek immediate medical care if:    · You cannot keep down fluids.     · Your pain gets worse.     · You have a fever or chills.     · You have new or worse pain in your back just below your rib cage (the flank area).     · You have new or more blood in your urine.    Watch closely for changes in your health, and be sure to contact your doctor if:    · You do not get better as expected. Where can you learn more? Go to http://slim-michael.info/. Enter P067 in the search box to learn more about \"Kidney Stone: Care Instructions. \"  Current as of: March 14, 2018  Content Version: 11.9  © 8612-3748 [email or web address]. Care instructions adapted under license by [employer] (which disclaims liability or warranty for this information). If you have questions about a medical condition or this instruction, always ask your healthcare professional. [address] any warranty or liability for your use of this information.